# Patient Record
Sex: MALE | Race: WHITE | NOT HISPANIC OR LATINO | URBAN - METROPOLITAN AREA
[De-identification: names, ages, dates, MRNs, and addresses within clinical notes are randomized per-mention and may not be internally consistent; named-entity substitution may affect disease eponyms.]

---

## 2023-04-14 ENCOUNTER — OFFICE VISIT (OUTPATIENT)
Dept: PRIMARY CARE | Facility: CLINIC | Age: 63
End: 2023-04-14
Payer: COMMERCIAL

## 2023-04-14 ENCOUNTER — LAB (OUTPATIENT)
Dept: LAB | Facility: LAB | Age: 63
End: 2023-04-14
Payer: COMMERCIAL

## 2023-04-14 VITALS
HEART RATE: 53 BPM | BODY MASS INDEX: 22.01 KG/M2 | OXYGEN SATURATION: 97 % | WEIGHT: 177 LBS | DIASTOLIC BLOOD PRESSURE: 75 MMHG | SYSTOLIC BLOOD PRESSURE: 115 MMHG | HEIGHT: 75 IN

## 2023-04-14 DIAGNOSIS — Z00.00 ANNUAL PHYSICAL EXAM: Primary | ICD-10-CM

## 2023-04-14 DIAGNOSIS — Z00.00 ANNUAL PHYSICAL EXAM: ICD-10-CM

## 2023-04-14 LAB
CALCIDIOL (25 OH VITAMIN D3) (NG/ML) IN SER/PLAS: 24 NG/ML
ERYTHROCYTE DISTRIBUTION WIDTH (RATIO) BY AUTOMATED COUNT: 11.8 % (ref 11.5–14.5)
ERYTHROCYTE MEAN CORPUSCULAR HEMOGLOBIN CONCENTRATION (G/DL) BY AUTOMATED: 32.6 G/DL (ref 32–36)
ERYTHROCYTE MEAN CORPUSCULAR VOLUME (FL) BY AUTOMATED COUNT: 93 FL (ref 80–100)
ERYTHROCYTES (10*6/UL) IN BLOOD BY AUTOMATED COUNT: 4.86 X10E12/L (ref 4.5–5.9)
ESTIMATED AVERAGE GLUCOSE FOR HBA1C: 103 MG/DL
HEMATOCRIT (%) IN BLOOD BY AUTOMATED COUNT: 45.1 % (ref 41–52)
HEMOGLOBIN (G/DL) IN BLOOD: 14.7 G/DL (ref 13.5–17.5)
HEMOGLOBIN A1C/HEMOGLOBIN TOTAL IN BLOOD: 5.2 %
LEUKOCYTES (10*3/UL) IN BLOOD BY AUTOMATED COUNT: 5.3 X10E9/L (ref 4.4–11.3)
NRBC (PER 100 WBCS) BY AUTOMATED COUNT: 0 /100 WBC (ref 0–0)
PLATELETS (10*3/UL) IN BLOOD AUTOMATED COUNT: 250 X10E9/L (ref 150–450)
PROSTATE SPECIFIC ANTIGEN,SCREEN: 0.49 NG/ML (ref 0–4)
THYROTROPIN (MIU/L) IN SER/PLAS BY DETECTION LIMIT <= 0.05 MIU/L: 0.43 MIU/L (ref 0.44–3.98)
THYROXINE (T4) FREE (NG/DL) IN SER/PLAS: 1.14 NG/DL (ref 0.78–1.48)

## 2023-04-14 PROCEDURE — 82306 VITAMIN D 25 HYDROXY: CPT

## 2023-04-14 PROCEDURE — 80061 LIPID PANEL: CPT

## 2023-04-14 PROCEDURE — 84439 ASSAY OF FREE THYROXINE: CPT

## 2023-04-14 PROCEDURE — 84443 ASSAY THYROID STIM HORMONE: CPT

## 2023-04-14 PROCEDURE — 85027 COMPLETE CBC AUTOMATED: CPT

## 2023-04-14 PROCEDURE — 84153 ASSAY OF PSA TOTAL: CPT

## 2023-04-14 PROCEDURE — 99396 PREV VISIT EST AGE 40-64: CPT | Performed by: INTERNAL MEDICINE

## 2023-04-14 PROCEDURE — 1036F TOBACCO NON-USER: CPT | Performed by: INTERNAL MEDICINE

## 2023-04-14 PROCEDURE — 80053 COMPREHEN METABOLIC PANEL: CPT

## 2023-04-14 PROCEDURE — 83036 HEMOGLOBIN GLYCOSYLATED A1C: CPT

## 2023-04-14 PROCEDURE — 36415 COLL VENOUS BLD VENIPUNCTURE: CPT

## 2023-04-14 NOTE — PROGRESS NOTES
Subjective   Patient ID: Terrance Sosa is a 62 y.o. male who presents for the following    PHYSICAL     Assessment/Plan   CBC, CMP, lipid panel, a1c, tsh, vitamin d, PSA      colonoscopy with dr solares 2023 with 3-5 year follow up     CAD: last stress test 2021 is wnl. Ekg done with cardiology        bilateral cerumen impaction: recommend [facility]     HPI  male with htn, hld, cad comes for the following       INTERMITTENT elevated LFT's: not clinically signifcant and HIV, hepatitis panel, shilpa wnl . will continue to monitor      CAD in the past needing randolph stent to lad. he had at that time he had sob with playing basketball. no chest pain with exertion. following with dr gonsalves      HTN: patient denies any headaches, blurred vision or dizziness. patient denies any stroke like symptoms     HLD: Patient denies any muscle aches and is tolerating statin therapy     no etoh, no illegal dugs. no smokes     mom  cancer large cell lymphoma b     dad  from cancer pancreatic cancer     colonoscopy 2017 follows with dr solares      patient is a  [employer]     There were no vitals taken for this visit.  PHYSICAL EXAM     General appearance: Alert and in no acute distress. speech is clear and coherent  HEENT: Sclera and conjunctiva white, EOMI, uvela midline, no mouth lesions. PERRLA,  nasal turbinates are not swollen without exudate. TM's Mancia with cone of light, external ear canal with scant cerumen. No head trauma  Neck: no carotid bruits or thyromegaly. no lymphadenopathy   Respiratory : No respiratory distress, normal respiratory rhythm and effort. Clear bilateral breath sounds. No wheezing or rhonchi.   Cardiovascular: heart rate regular, S1, S2. no murmurs. no Lower extremity edema  Skin inspection: Normal skin color and pigmentation, normal skin turgor and no visible rash, induration, or cellulitis  MSK: 5/5 strength upper and lower extremities without gait  abnormalities. no loss of muscle mass   Neuro: 2-12 CN grossly intact.  no slurred speech. no lateralizing deficit  Psychiatric Orientation: Oriented to person, place, and time. no depression, homicidal or suicidal thoughts, normal affect  Abdomen: soft, none tender, none distended. no organomegaly    REVIEW OF SYSTEMS   Constitutional: not feeling tired and no fever, chills or sweats. Denies weight loss    HEENT: no earache and no sore throat. no blurred vision and or double vision. no headache  Cardiovascular: no exertional chest pain, no palpitations, no lower extremity edema and no intermittent leg claudication.   Lungs: Denies shortness of breath, exertional dyspnea, wheezing  Gastrointestinal: no change in bowel habits, no diarrhea, no nausea, no vomiting and no abdominal pain. Denies Melena, brbpr or dark stool  Musculoskeletal: no myalgias, no muscle weakness and no limb swelling.   Skin: no rashes, no change in skin color and pigmentation and no skin lumps.   Neurological: no headaches, no seizures, no numbness, no lateralizing deficits and no fainting.   Psychiatric: no depression and no anxiety.   Urine: denies polyuria, hematuria, dysuria   Endocrine: no cold intolerance, no heat intolerance      Not on File    No current outpatient medications on file.     No current facility-administered medications for this visit.       Objective     No visits with results within 4 Month(s) from this visit.   Latest known visit with results is:   Legacy Encounter on 12/11/2020   Component Date Value Ref Range Status    Protime 12/11/2020 11.3  10.1 - 13.3 sec Final    INR 12/11/2020 1.0  0.9 - 1.1 Final    HIV 1 and 2 Screen 12/11/2020 NONREACTIVE  NONREACTIVE Final    Glucose 12/11/2020 67 (L)  74 - 99 mg/dL Final    Sodium 12/11/2020 141  136 - 145 mmol/L Final    Potassium 12/11/2020 4.3  3.5 - 5.3 mmol/L Final    Chloride 12/11/2020 102  98 - 107 mmol/L Final    Bicarbonate 12/11/2020 32  21 - 32 mmol/L Final     Anion Gap 12/11/2020 11  10 - 20 mmol/L Final    Urea Nitrogen 12/11/2020 19  6 - 23 mg/dL Final    Creatinine 12/11/2020 1.04  0.50 - 1.30 mg/dL Final    GLOMERULAR FILTRATION RATE-NON AFR* 12/11/2020 >60  >60 mL/min/1.73m2 Final    GLOMERULAR FILTRATION RATE-* 12/11/2020 >60  >60 mL/min/1.73m2 Final    Calcium 12/11/2020 9.7  8.6 - 10.6 mg/dL Final    Albumin 12/11/2020 4.6  3.4 - 5.0 g/dL Final    Alkaline Phosphatase 12/11/2020 83  33 - 120 U/L Final    Total Protein 12/11/2020 6.7  6.4 - 8.2 g/dL Final    AST 12/11/2020 35  9 - 39 U/L Final    Total Bilirubin 12/11/2020 0.7  0.0 - 1.2 mg/dL Final    ALT (SGPT) 12/11/2020 51  10 - 52 U/L Final    Hep A IgM 12/11/2020 NONREACTIVE  NONREACTIVE Final    Hep B Core IgM 12/11/2020 NONREACTIVE  NONREACTIVE Final    Hepatitis B Surface Ag 12/11/2020 NONREACTIVE  NONREACTIVE Final    Hepatitis C Ab 12/11/2020 NONREACTIVE  NONREACTIVE Final    ANTI-NUCLEAR ANTIBODY (RAKAN) 12/11/2020 NEGATIVE  NEGATIVE Final       Radiology: Reviewed imaging in powerchart.  No results found.    No family history on file.  Social History     Socioeconomic History    Marital status: Unknown     Spouse name: Not on file    Number of children: Not on file    Years of education: Not on file    Highest education level: Not on file   Occupational History    Not on file   Tobacco Use    Smoking status: Not on file    Smokeless tobacco: Not on file   Vaping Use    Vaping status: Not on file   Substance and Sexual Activity    Alcohol use: Not on file    Drug use: Not on file    Sexual activity: Not on file   Other Topics Concern    Not on file   Social History Narrative    Not on file     Social Determinants of Health     Financial Resource Strain: Not on file   Food Insecurity: Not on file   Transportation Needs: Not on file   Physical Activity: Not on file   Stress: Not on file   Social Connections: Not on file   Intimate Partner Violence: Not on file   Housing Stability: Not on file      Past Medical History:   Diagnosis Date    Presence of coronary angioplasty implant and graft     S/P coronary artery stent placement     Past Surgical History:   Procedure Laterality Date    OTHER SURGICAL HISTORY  11/30/2020    Colonoscopy       Charting was completed using voice recognition technology and may include unintended errors.

## 2023-04-15 LAB
ALANINE AMINOTRANSFERASE (SGPT) (U/L) IN SER/PLAS: 66 U/L (ref 10–52)
ALBUMIN (G/DL) IN SER/PLAS: 4.4 G/DL (ref 3.4–5)
ALKALINE PHOSPHATASE (U/L) IN SER/PLAS: 63 U/L (ref 33–136)
ANION GAP IN SER/PLAS: 12 MMOL/L (ref 10–20)
ASPARTATE AMINOTRANSFERASE (SGOT) (U/L) IN SER/PLAS: 43 U/L (ref 9–39)
BILIRUBIN TOTAL (MG/DL) IN SER/PLAS: 0.8 MG/DL (ref 0–1.2)
CALCIUM (MG/DL) IN SER/PLAS: 9.8 MG/DL (ref 8.6–10.6)
CARBON DIOXIDE, TOTAL (MMOL/L) IN SER/PLAS: 32 MMOL/L (ref 21–32)
CHLORIDE (MMOL/L) IN SER/PLAS: 103 MMOL/L (ref 98–107)
CHOLESTEROL (MG/DL) IN SER/PLAS: 133 MG/DL (ref 0–199)
CHOLESTEROL IN HDL (MG/DL) IN SER/PLAS: 46.2 MG/DL
CHOLESTEROL/HDL RATIO: 2.9
CREATININE (MG/DL) IN SER/PLAS: 1.08 MG/DL (ref 0.5–1.3)
GFR MALE: 78 ML/MIN/1.73M2
GLUCOSE (MG/DL) IN SER/PLAS: 82 MG/DL (ref 74–99)
LDL: 75 MG/DL (ref 0–99)
POTASSIUM (MMOL/L) IN SER/PLAS: 4.2 MMOL/L (ref 3.5–5.3)
PROTEIN TOTAL: 6.7 G/DL (ref 6.4–8.2)
SODIUM (MMOL/L) IN SER/PLAS: 143 MMOL/L (ref 136–145)
TRIGLYCERIDE (MG/DL) IN SER/PLAS: 58 MG/DL (ref 0–149)
UREA NITROGEN (MG/DL) IN SER/PLAS: 24 MG/DL (ref 6–23)
VLDL: 12 MG/DL (ref 0–40)

## 2023-04-17 ENCOUNTER — CLINICAL SUPPORT (OUTPATIENT)
Dept: PRIMARY CARE | Facility: CLINIC | Age: 63
End: 2023-04-17
Payer: COMMERCIAL

## 2023-04-17 DIAGNOSIS — H61.23 IMPACTED CERUMEN, BILATERAL: ICD-10-CM

## 2023-04-17 PROCEDURE — 69209 REMOVE IMPACTED EAR WAX UNI: CPT | Performed by: INTERNAL MEDICINE

## 2023-04-17 NOTE — PROGRESS NOTES
Patient here for bilateral ear lavage. Patient tolerated procedure well. Patient satisfied with results.

## 2023-04-19 ENCOUNTER — TELEPHONE (OUTPATIENT)
Dept: PRIMARY CARE | Facility: CLINIC | Age: 63
End: 2023-04-19
Payer: COMMERCIAL

## 2023-04-19 NOTE — TELEPHONE ENCOUNTER
----- Message from Sylvain Lam DO sent at 4/18/2023  9:38 AM EDT -----  Recommend a virtual visit in 2-4 weeks to discuss liver function abnormality

## 2023-04-20 ENCOUNTER — TELEPHONE (OUTPATIENT)
Dept: PRIMARY CARE | Facility: CLINIC | Age: 63
End: 2023-04-20
Payer: COMMERCIAL

## 2023-05-05 ENCOUNTER — APPOINTMENT (OUTPATIENT)
Dept: PRIMARY CARE | Facility: CLINIC | Age: 63
End: 2023-05-05
Payer: COMMERCIAL

## 2023-05-15 ENCOUNTER — TELEMEDICINE (OUTPATIENT)
Dept: PRIMARY CARE | Facility: CLINIC | Age: 63
End: 2023-05-15
Payer: COMMERCIAL

## 2023-05-15 DIAGNOSIS — R79.89 ELEVATED LIVER FUNCTION TESTS: Primary | ICD-10-CM

## 2023-05-15 PROCEDURE — 99212 OFFICE O/P EST SF 10 MIN: CPT | Performed by: INTERNAL MEDICINE

## 2023-05-15 NOTE — PROGRESS NOTES
Subjective   Patient ID: Terrance Sosa is a 62 y.o. male who presents for the following    Virtual visit     PHYSICAL 2023    Assessment/Plan   ELEVATE LIVER FUNCTION:  acute hepatitis panel and HIV niegative. He had a liver ultrasound in  which was wnl. Patient denies drinking alcohol. Recommend referral to GI     Vit D low, recommend 1000 units daily      colonoscopy with dr solares 2023 with 3-5 year follow up     CAD: last stress test 2021 is wnl. Ekg done with cardiology        bilateral cerumen impaction: recommend [facility]     HPI  male with htn, hld, cad comes for the following       INTERMITTENT elevated LFT's: not clinically signifcant and HIV, hepatitis panel, shilpa wnl . will continue to monitor      CAD in the past needing randolph stent to lad. he had at that time he had sob with playing basketball. no chest pain with exertion. following with dr gonsalves      HTN: patient denies any headaches, blurred vision or dizziness. patient denies any stroke like symptoms     HLD: Patient denies any muscle aches and is tolerating statin therapy     no etoh, no illegal dugs. no smokes     mom  cancer large cell lymphoma b     dad  from cancer pancreatic cancer     colonoscopy 2017 follows with dr solares      patient is a  [employer]     Visit Vitals  Smoking Status Never     PHYSICAL EXAM     General appearance: Alert and in no acute distress. speech is clear and coherent  HEENT: Sclera and conjunctiva white, EOMI, uvela midline, no mouth lesions. PERRLA,  nasal turbinates are not swollen without exudate. TM's Mancia with cone of light, external ear canal with scant cerumen. No head trauma  Neck: no carotid bruits or thyromegaly. no lymphadenopathy   Respiratory : No respiratory distress, normal respiratory rhythm and effort. Clear bilateral breath sounds. No wheezing or rhonchi.   Cardiovascular: heart rate regular, S1, S2. no murmurs. no Lower extremity  edema  Skin inspection: Normal skin color and pigmentation, normal skin turgor and no visible rash, induration, or cellulitis  MSK: 5/5 strength upper and lower extremities without gait abnormalities. no loss of muscle mass   Neuro: 2-12 CN grossly intact.  no slurred speech. no lateralizing deficit  Psychiatric Orientation: Oriented to person, place, and time. no depression, homicidal or suicidal thoughts, normal affect  Abdomen: soft, none tender, none distended. no organomegaly    REVIEW OF SYSTEMS   Constitutional: not feeling tired and no fever, chills or sweats. Denies weight loss    HEENT: no earache and no sore throat. no blurred vision and or double vision. no headache  Cardiovascular: no exertional chest pain, no palpitations, no lower extremity edema and no intermittent leg claudication.   Lungs: Denies shortness of breath, exertional dyspnea, wheezing  Gastrointestinal: no change in bowel habits, no diarrhea, no nausea, no vomiting and no abdominal pain. Denies Melena, brbpr or dark stool  Musculoskeletal: no myalgias, no muscle weakness and no limb swelling.   Skin: no rashes, no change in skin color and pigmentation and no skin lumps.   Neurological: no headaches, no seizures, no numbness, no lateralizing deficits and no fainting.   Psychiatric: no depression and no anxiety.   Urine: denies polyuria, hematuria, dysuria   Endocrine: no cold intolerance, no heat intolerance      No Known Allergies    No current outpatient medications on file.     No current facility-administered medications for this visit.       Objective     Lab on 04/14/2023   Component Date Value Ref Range Status    WBC 04/14/2023 5.3  4.4 - 11.3 x10E9/L Final    nRBC 04/14/2023 0.0  0.0 - 0.0 /100 WBC Final    RBC 04/14/2023 4.86  4.50 - 5.90 x10E12/L Final    Hemoglobin 04/14/2023 14.7  13.5 - 17.5 g/dL Final    Hematocrit 04/14/2023 45.1  41.0 - 52.0 % Final    MCV 04/14/2023 93  80 - 100 fL Final    MCHC 04/14/2023 32.6  32.0 -  36.0 g/dL Final    Platelets 04/14/2023 250  150 - 450 x10E9/L Final    RDW 04/14/2023 11.8  11.5 - 14.5 % Final    Glucose 04/14/2023 82  74 - 99 mg/dL Final    Sodium 04/14/2023 143  136 - 145 mmol/L Final    Potassium 04/14/2023 4.2  3.5 - 5.3 mmol/L Final    Chloride 04/14/2023 103  98 - 107 mmol/L Final    Bicarbonate 04/14/2023 32  21 - 32 mmol/L Final    Anion Gap 04/14/2023 12  10 - 20 mmol/L Final    Urea Nitrogen 04/14/2023 24 (H)  6 - 23 mg/dL Final    Creatinine 04/14/2023 1.08  0.50 - 1.30 mg/dL Final    GFR MALE 04/14/2023 78  >90 mL/min/1.73m2 Final    Calcium 04/14/2023 9.8  8.6 - 10.6 mg/dL Final    Albumin 04/14/2023 4.4  3.4 - 5.0 g/dL Final    Alkaline Phosphatase 04/14/2023 63  33 - 136 U/L Final    Total Protein 04/14/2023 6.7  6.4 - 8.2 g/dL Final    AST 04/14/2023 43 (H)  9 - 39 U/L Final    Total Bilirubin 04/14/2023 0.8  0.0 - 1.2 mg/dL Final    ALT (SGPT) 04/14/2023 66 (H)  10 - 52 U/L Final    Hemoglobin A1C 04/14/2023 5.2  % Final    Estimated Average Glucose 04/14/2023 103  MG/DL Final    Cholesterol 04/14/2023 133  0 - 199 mg/dL Final    HDL 04/14/2023 46.2  mg/dL Final    Cholesterol/HDL Ratio 04/14/2023 2.9   Final    LDL 04/14/2023 75  0 - 99 mg/dL Final    VLDL 04/14/2023 12  0 - 40 mg/dL Final    Triglycerides 04/14/2023 58  0 - 149 mg/dL Final    TSH 04/14/2023 0.43 (L)  0.44 - 3.98 mIU/L Final    Vitamin D, 25-Hydroxy 04/14/2023 24 (A)  ng/mL Final    Prostate Specific Antigen,Screen 04/14/2023 0.49  0.00 - 4.00 ng/mL Final    Free T4 04/14/2023 1.14  0.78 - 1.48 ng/dL Final       Radiology: Reviewed imaging in powerchart.  No results found.    No family history on file.  Social History     Socioeconomic History    Marital status: Single     Spouse name: Not on file    Number of children: Not on file    Years of education: Not on file    Highest education level: Not on file   Occupational History    Not on file   Tobacco Use    Smoking status: Never    Smokeless tobacco: Never    Vaping Use    Vaping status: Not on file   Substance and Sexual Activity    Alcohol use: Not on file    Drug use: Not on file    Sexual activity: Not on file   Other Topics Concern    Not on file   Social History Narrative    Not on file     Social Determinants of Health     Financial Resource Strain: Not on file   Food Insecurity: Not on file   Transportation Needs: Not on file   Physical Activity: Not on file   Stress: Not on file   Social Connections: Not on file   Intimate Partner Violence: Not on file   Housing Stability: Not on file     Past Medical History:   Diagnosis Date    Presence of coronary angioplasty implant and graft     S/P coronary artery stent placement     Past Surgical History:   Procedure Laterality Date    OTHER SURGICAL HISTORY  11/30/2020    Colonoscopy       Charting was completed using voice recognition technology and may include unintended errors.

## 2024-02-20 ENCOUNTER — OFFICE VISIT (OUTPATIENT)
Dept: PRIMARY CARE | Facility: CLINIC | Age: 64
End: 2024-02-20
Payer: COMMERCIAL

## 2024-02-20 ENCOUNTER — LAB (OUTPATIENT)
Dept: LAB | Facility: LAB | Age: 64
End: 2024-02-20
Payer: COMMERCIAL

## 2024-02-20 VITALS
OXYGEN SATURATION: 95 % | BODY MASS INDEX: 23.05 KG/M2 | WEIGHT: 185.4 LBS | HEIGHT: 75 IN | HEART RATE: 61 BPM | SYSTOLIC BLOOD PRESSURE: 120 MMHG | DIASTOLIC BLOOD PRESSURE: 80 MMHG

## 2024-02-20 DIAGNOSIS — I25.10 CORONARY ARTERY DISEASE INVOLVING NATIVE CORONARY ARTERY OF NATIVE HEART WITHOUT ANGINA PECTORIS: ICD-10-CM

## 2024-02-20 DIAGNOSIS — Z00.00 ANNUAL PHYSICAL EXAM: ICD-10-CM

## 2024-02-20 DIAGNOSIS — Z00.00 ANNUAL PHYSICAL EXAM: Primary | ICD-10-CM

## 2024-02-20 DIAGNOSIS — R79.89 ELEVATED LIVER FUNCTION TESTS: ICD-10-CM

## 2024-02-20 DIAGNOSIS — E55.9 VITAMIN D DEFICIENCY: ICD-10-CM

## 2024-02-20 LAB
25(OH)D3 SERPL-MCNC: 34 NG/ML (ref 30–100)
ALBUMIN SERPL BCP-MCNC: 4.2 G/DL (ref 3.4–5)
ALP SERPL-CCNC: 65 U/L (ref 33–136)
ALT SERPL W P-5'-P-CCNC: 49 U/L (ref 10–52)
ANION GAP SERPL CALC-SCNC: 12 MMOL/L (ref 10–20)
AST SERPL W P-5'-P-CCNC: 37 U/L (ref 9–39)
BILIRUB SERPL-MCNC: 0.8 MG/DL (ref 0–1.2)
BUN SERPL-MCNC: 21 MG/DL (ref 6–23)
CALCIUM SERPL-MCNC: 9.3 MG/DL (ref 8.6–10.6)
CHLORIDE SERPL-SCNC: 104 MMOL/L (ref 98–107)
CHOLEST SERPL-MCNC: 145 MG/DL (ref 0–199)
CHOLESTEROL/HDL RATIO: 2.7
CO2 SERPL-SCNC: 29 MMOL/L (ref 21–32)
CREAT SERPL-MCNC: 0.98 MG/DL (ref 0.5–1.3)
EGFRCR SERPLBLD CKD-EPI 2021: 88 ML/MIN/1.73M*2
ERYTHROCYTE [DISTWIDTH] IN BLOOD BY AUTOMATED COUNT: 11.8 % (ref 11.5–14.5)
EST. AVERAGE GLUCOSE BLD GHB EST-MCNC: 100 MG/DL
GLUCOSE SERPL-MCNC: 102 MG/DL (ref 74–99)
HBA1C MFR BLD: 5.1 %
HCT VFR BLD AUTO: 44.8 % (ref 41–52)
HDLC SERPL-MCNC: 53.2 MG/DL
HGB BLD-MCNC: 15.6 G/DL (ref 13.5–17.5)
LDLC SERPL CALC-MCNC: 79 MG/DL
MCH RBC QN AUTO: 31.5 PG (ref 26–34)
MCHC RBC AUTO-ENTMCNC: 34.8 G/DL (ref 32–36)
MCV RBC AUTO: 90 FL (ref 80–100)
NON HDL CHOLESTEROL: 92 MG/DL (ref 0–149)
NRBC BLD-RTO: 0 /100 WBCS (ref 0–0)
PLATELET # BLD AUTO: 173 X10*3/UL (ref 150–450)
POTASSIUM SERPL-SCNC: 4.2 MMOL/L (ref 3.5–5.3)
PROT SERPL-MCNC: 6.4 G/DL (ref 6.4–8.2)
PSA SERPL-MCNC: 0.54 NG/ML
RBC # BLD AUTO: 4.96 X10*6/UL (ref 4.5–5.9)
SODIUM SERPL-SCNC: 141 MMOL/L (ref 136–145)
TRIGL SERPL-MCNC: 62 MG/DL (ref 0–149)
TSH SERPL-ACNC: 0.59 MIU/L (ref 0.44–3.98)
VLDL: 12 MG/DL (ref 0–40)
WBC # BLD AUTO: 4.3 X10*3/UL (ref 4.4–11.3)

## 2024-02-20 PROCEDURE — 83036 HEMOGLOBIN GLYCOSYLATED A1C: CPT

## 2024-02-20 PROCEDURE — 93000 ELECTROCARDIOGRAM COMPLETE: CPT | Performed by: INTERNAL MEDICINE

## 2024-02-20 PROCEDURE — 1036F TOBACCO NON-USER: CPT | Performed by: INTERNAL MEDICINE

## 2024-02-20 PROCEDURE — 99396 PREV VISIT EST AGE 40-64: CPT | Performed by: INTERNAL MEDICINE

## 2024-02-20 PROCEDURE — 84443 ASSAY THYROID STIM HORMONE: CPT

## 2024-02-20 PROCEDURE — 80053 COMPREHEN METABOLIC PANEL: CPT

## 2024-02-20 PROCEDURE — 80061 LIPID PANEL: CPT

## 2024-02-20 PROCEDURE — 36415 COLL VENOUS BLD VENIPUNCTURE: CPT

## 2024-02-20 PROCEDURE — 82306 VITAMIN D 25 HYDROXY: CPT

## 2024-02-20 PROCEDURE — 84153 ASSAY OF PSA TOTAL: CPT

## 2024-02-20 PROCEDURE — 85027 COMPLETE CBC AUTOMATED: CPT

## 2024-02-20 RX ORDER — DOXYCYCLINE HYCLATE 20 MG
20 TABLET ORAL 2 TIMES DAILY
COMMUNITY
Start: 2016-02-23

## 2024-02-20 RX ORDER — SIMVASTATIN 20 MG/1
20 TABLET, FILM COATED ORAL NIGHTLY
COMMUNITY
Start: 2013-04-16

## 2024-02-20 NOTE — PROGRESS NOTES
"Subjective   Patient ID: Terrance Sosa is a 63 y.o. male who presents for the following     PHYSICAL 2023 --> 2024    Assessment/Plan   ELEVATE LIVER FUNCTION:  acute hepatitis panel and HIV niegative. He had a liver ultrasound in  which was wnl. Patient denies drinking alcohol. Recommend referral to GI     Vit D low, recommend 1000 units daily      colonoscopy with dr solares 2023 with 3-5 year follow up     CAD: last stress test 2021 is wnl. Ekg done with cardiology        bilateral cerumen impaction: recommend [facility]     HPI  male with htn, hld, cad comes for the following       INTERMITTENT elevated LFT's: not clinically signifcant and HIV, hepatitis panel, shilpa wnl . will continue to monitor      CAD in the past needing randolph stent to lad. he had at that time he had sob with playing basketball. no chest pain with exertion. following with dr gonsalves      HTN: patient denies any headaches, blurred vision or dizziness. patient denies any stroke like symptoms     HLD: Patient denies any muscle aches and is tolerating statin therapy     no etoh, no illegal dugs. no smokes     mom  cancer large cell lymphoma b     dad  from cancer pancreatic cancer     patient is a  [employer]     Visit Vitals  /80 (BP Location: Left arm, Patient Position: Sitting, BP Cuff Size: Adult)   Pulse 61   Ht 1.905 m (6' 3\")   Wt 84.1 kg (185 lb 6.4 oz)   SpO2 95%   BMI 23.17 kg/m²   Smoking Status Never   BSA 2.11 m²     PHYSICAL EXAM     General appearance: Alert and in no acute distress. speech is clear and coherent  HEENT: Sclera and conjunctiva white, EOMI, uvela midline, no mouth lesions. PERRLA,  nasal turbinates are not swollen without exudate. TM's Mancia with cone of light, external ear canal with scant cerumen. No head trauma  Neck: no carotid bruits or thyromegaly. no lymphadenopathy   Respiratory : No respiratory distress, normal respiratory rhythm and effort. Clear " bilateral breath sounds. No wheezing or rhonchi.   Cardiovascular: heart rate regular, S1, S2. no murmurs. no Lower extremity edema  Skin inspection: Normal skin color and pigmentation, normal skin turgor and no visible rash, induration, or cellulitis  MSK: 5/5 strength upper and lower extremities without gait abnormalities. no loss of muscle mass   Neuro: 2-12 CN grossly intact.  no slurred speech. no lateralizing deficit  Psychiatric Orientation: Oriented to person, place, and time. no depression, homicidal or suicidal thoughts, normal affect  Abdomen: soft, none tender, none distended. no organomegaly    REVIEW OF SYSTEMS   Constitutional: not feeling tired and no fever, chills or sweats. Denies weight loss    HEENT: no earache and no sore throat. no blurred vision and or double vision. no headache  Cardiovascular: no exertional chest pain, no palpitations, no lower extremity edema and no intermittent leg claudication.   Lungs: Denies shortness of breath, exertional dyspnea, wheezing  Gastrointestinal: no change in bowel habits, no diarrhea, no nausea, no vomiting and no abdominal pain. Denies Melena, brbpr or dark stool  Musculoskeletal: no myalgias, no muscle weakness and no limb swelling.   Skin: no rashes, no change in skin color and pigmentation and no skin lumps.   Neurological: no headaches, no seizures, no numbness, no lateralizing deficits and no fainting.   Psychiatric: no depression and no anxiety.   Urine: denies polyuria, hematuria, dysuria   Endocrine: no cold intolerance, no heat intolerance      No Known Allergies    Current Outpatient Medications   Medication Sig Dispense Refill    doxycycline (Periostat) 20 mg tablet Take 1 tablet (20 mg) by mouth 2 times a day.      simvastatin (Zocor) 20 mg tablet Take 1 tablet (20 mg) by mouth once daily at bedtime.       No current facility-administered medications for this visit.       Objective     No visits with results within 4 Month(s) from this visit.    Latest known visit with results is:   Lab on 04/14/2023   Component Date Value Ref Range Status    WBC 04/14/2023 5.3  4.4 - 11.3 x10E9/L Final    nRBC 04/14/2023 0.0  0.0 - 0.0 /100 WBC Final    RBC 04/14/2023 4.86  4.50 - 5.90 x10E12/L Final    Hemoglobin 04/14/2023 14.7  13.5 - 17.5 g/dL Final    Hematocrit 04/14/2023 45.1  41.0 - 52.0 % Final    MCV 04/14/2023 93  80 - 100 fL Final    MCHC 04/14/2023 32.6  32.0 - 36.0 g/dL Final    Platelets 04/14/2023 250  150 - 450 x10E9/L Final    RDW 04/14/2023 11.8  11.5 - 14.5 % Final    Glucose 04/14/2023 82  74 - 99 mg/dL Final    Sodium 04/14/2023 143  136 - 145 mmol/L Final    Potassium 04/14/2023 4.2  3.5 - 5.3 mmol/L Final    Chloride 04/14/2023 103  98 - 107 mmol/L Final    Bicarbonate 04/14/2023 32  21 - 32 mmol/L Final    Anion Gap 04/14/2023 12  10 - 20 mmol/L Final    Urea Nitrogen 04/14/2023 24 (H)  6 - 23 mg/dL Final    Creatinine 04/14/2023 1.08  0.50 - 1.30 mg/dL Final    GFR MALE 04/14/2023 78  >90 mL/min/1.73m2 Final    Calcium 04/14/2023 9.8  8.6 - 10.6 mg/dL Final    Albumin 04/14/2023 4.4  3.4 - 5.0 g/dL Final    Alkaline Phosphatase 04/14/2023 63  33 - 136 U/L Final    Total Protein 04/14/2023 6.7  6.4 - 8.2 g/dL Final    AST 04/14/2023 43 (H)  9 - 39 U/L Final    Total Bilirubin 04/14/2023 0.8  0.0 - 1.2 mg/dL Final    ALT (SGPT) 04/14/2023 66 (H)  10 - 52 U/L Final    Hemoglobin A1C 04/14/2023 5.2  % Final    Estimated Average Glucose 04/14/2023 103  MG/DL Final    Cholesterol 04/14/2023 133  0 - 199 mg/dL Final    HDL 04/14/2023 46.2  mg/dL Final    Cholesterol/HDL Ratio 04/14/2023 2.9   Final    LDL 04/14/2023 75  0 - 99 mg/dL Final    VLDL 04/14/2023 12  0 - 40 mg/dL Final    Triglycerides 04/14/2023 58  0 - 149 mg/dL Final    TSH 04/14/2023 0.43 (L)  0.44 - 3.98 mIU/L Final    Vitamin D, 25-Hydroxy 04/14/2023 24 (A)  ng/mL Final    Prostate Specific Antigen,Screen 04/14/2023 0.49  0.00 - 4.00 ng/mL Final    Free T4 04/14/2023 1.14  0.78 - 1.48  ng/dL Final       Radiology: Reviewed imaging in powerchart.  No results found.    No family history on file.  Social History     Socioeconomic History    Marital status: Single     Spouse name: None    Number of children: None    Years of education: None    Highest education level: None   Occupational History    None   Tobacco Use    Smoking status: Never    Smokeless tobacco: Never   Substance and Sexual Activity    Alcohol use: None    Drug use: None    Sexual activity: None   Other Topics Concern    None   Social History Narrative    None     Social Determinants of Health     Financial Resource Strain: Not on file   Food Insecurity: Not on file   Transportation Needs: Not on file   Physical Activity: Not on file   Stress: Not on file   Social Connections: Not on file   Intimate Partner Violence: Not on file   Housing Stability: Not on file     Past Medical History:   Diagnosis Date    Presence of coronary angioplasty implant and graft     S/P coronary artery stent placement     Past Surgical History:   Procedure Laterality Date    OTHER SURGICAL HISTORY  11/30/2020    Colonoscopy       Charting was completed using voice recognition technology and may include unintended errors.

## 2024-08-06 ENCOUNTER — OFFICE VISIT (OUTPATIENT)
Dept: PRIMARY CARE | Facility: CLINIC | Age: 64
End: 2024-08-06
Payer: COMMERCIAL

## 2024-08-06 VITALS
WEIGHT: 186 LBS | HEART RATE: 70 BPM | BODY MASS INDEX: 23.13 KG/M2 | DIASTOLIC BLOOD PRESSURE: 80 MMHG | OXYGEN SATURATION: 96 % | SYSTOLIC BLOOD PRESSURE: 130 MMHG | HEIGHT: 75 IN

## 2024-08-06 DIAGNOSIS — L02.413 ABSCESS OF ARM, RIGHT: Primary | ICD-10-CM

## 2024-08-06 PROCEDURE — 3008F BODY MASS INDEX DOCD: CPT | Performed by: STUDENT IN AN ORGANIZED HEALTH CARE EDUCATION/TRAINING PROGRAM

## 2024-08-06 PROCEDURE — 99213 OFFICE O/P EST LOW 20 MIN: CPT | Performed by: STUDENT IN AN ORGANIZED HEALTH CARE EDUCATION/TRAINING PROGRAM

## 2024-08-06 PROCEDURE — 1036F TOBACCO NON-USER: CPT | Performed by: STUDENT IN AN ORGANIZED HEALTH CARE EDUCATION/TRAINING PROGRAM

## 2024-08-06 RX ORDER — ASPIRIN 81 MG/1
81 TABLET ORAL DAILY
COMMUNITY

## 2024-08-06 RX ORDER — SULFAMETHOXAZOLE AND TRIMETHOPRIM 800; 160 MG/1; MG/1
1 TABLET ORAL 2 TIMES DAILY
Qty: 14 TABLET | Refills: 0 | Status: SHIPPED | OUTPATIENT
Start: 2024-08-06 | End: 2024-08-13

## 2024-08-06 NOTE — PROGRESS NOTES
"Subjective   Patient ID: Terrance Sosa is a 64 y.o. male who presents for the following    Assessment/Plan   RUE Abscess  -Not ready to drain yet  -Already on doxycycline daily for many years for rosascea  -Add bactrim DS BID x 7 days   -Advised to stop doxycycline if diarrhea develops  -return precautions given   -Follow up in 1 week for incision/drainage    HPI  64M presents for acute sick visit. Has a \"lump\" on his R upper arm. Has had it for 1 year, but recently starting hurting a few days ago. Denies any fevers, chills, lightheadedness, dizziness, or drainage from the area.     Denies fevers, chills, weight loss, lightheadedness, dizziness, vision changes, sore throat, runny nose, CP, SOB, cough, palpitations, n/v/d, abd pain, black/bloody stools, arthralgias, mood disturbance, or new numbness/weakness/tingling in arms/legs/face.      Denies any major use of tobacco, alcohol or drugs     Visit Vitals  /80   Pulse 70   Ht 1.905 m (6' 3\")   Wt 84.4 kg (186 lb)   SpO2 96%   BMI 23.25 kg/m²   Smoking Status Never   BSA 2.11 m²     PHYSICAL EXAM    Physical Exam     Visit Vitals  /80   Pulse 70   Ht 1.905 m (6' 3\")   Wt 84.4 kg (186 lb)   SpO2 96%   BMI 23.25 kg/m²   Smoking Status Never   BSA 2.11 m²        General: NAD. NCAT. Aox3   HEENT: PERRLA. EOMI. MMM. Nares patent bl.  Cardiovascular: RRR. No MRG. S1/S2 wnl.   Respiratory: CTABL. No acute respiratory distress.   MSK: ROM x 4.   Extremities: No edema. Cap refill < 2 sec.   Skin: Quarter sized abscess near the R axilla. No LAD. No purulent drainage.  Neuro: Aox3. Cranial Nerves grossly intact. Motor/sensory wnl.   Psych: Mood wnl.           REVIEW OF SYSTEMS     ROS In HPI   No Known Allergies    Current Outpatient Medications   Medication Sig Dispense Refill    aspirin 81 mg EC tablet Take 1 tablet (81 mg) by mouth once daily.      doxycycline (Periostat) 20 mg tablet Take 1 tablet (20 mg) by mouth 2 times a day.      simvastatin (Zocor) 20 mg " tablet Take 1 tablet (20 mg) by mouth once daily at bedtime.       No current facility-administered medications for this visit.       Objective     No visits with results within 4 Month(s) from this visit.   Latest known visit with results is:   Lab on 02/20/2024   Component Date Value Ref Range Status    WBC 02/20/2024 4.3 (L)  4.4 - 11.3 x10*3/uL Final    nRBC 02/20/2024 0.0  0.0 - 0.0 /100 WBCs Final    RBC 02/20/2024 4.96  4.50 - 5.90 x10*6/uL Final    Hemoglobin 02/20/2024 15.6  13.5 - 17.5 g/dL Final    Hematocrit 02/20/2024 44.8  41.0 - 52.0 % Final    MCV 02/20/2024 90  80 - 100 fL Final    MCH 02/20/2024 31.5  26.0 - 34.0 pg Final    MCHC 02/20/2024 34.8  32.0 - 36.0 g/dL Final    RDW 02/20/2024 11.8  11.5 - 14.5 % Final    Platelets 02/20/2024 173  150 - 450 x10*3/uL Final    Glucose 02/20/2024 102 (H)  74 - 99 mg/dL Final    Sodium 02/20/2024 141  136 - 145 mmol/L Final    Potassium 02/20/2024 4.2  3.5 - 5.3 mmol/L Final    Chloride 02/20/2024 104  98 - 107 mmol/L Final    Bicarbonate 02/20/2024 29  21 - 32 mmol/L Final    Anion Gap 02/20/2024 12  10 - 20 mmol/L Final    Urea Nitrogen 02/20/2024 21  6 - 23 mg/dL Final    Creatinine 02/20/2024 0.98  0.50 - 1.30 mg/dL Final    eGFR 02/20/2024 88  >60 mL/min/1.73m*2 Final    Calcium 02/20/2024 9.3  8.6 - 10.6 mg/dL Final    Albumin 02/20/2024 4.2  3.4 - 5.0 g/dL Final    Alkaline Phosphatase 02/20/2024 65  33 - 136 U/L Final    Total Protein 02/20/2024 6.4  6.4 - 8.2 g/dL Final    AST 02/20/2024 37  9 - 39 U/L Final    Bilirubin, Total 02/20/2024 0.8  0.0 - 1.2 mg/dL Final    ALT 02/20/2024 49  10 - 52 U/L Final    Hemoglobin A1C 02/20/2024 5.1  see below % Final    Estimated Average Glucose 02/20/2024 100  Not Established mg/dL Final    Cholesterol 02/20/2024 145  0 - 199 mg/dL Final    HDL-Cholesterol 02/20/2024 53.2  mg/dL Final    Cholesterol/HDL Ratio 02/20/2024 2.7   Final    LDL Calculated 02/20/2024 79  <=99 mg/dL Final    VLDL 02/20/2024 12  0 - 40  mg/dL Final    Triglycerides 02/20/2024 62  0 - 149 mg/dL Final    Non HDL Cholesterol 02/20/2024 92  0 - 149 mg/dL Final    Thyroid Stimulating Hormone 02/20/2024 0.59  0.44 - 3.98 mIU/L Final    Vitamin D, 25-Hydroxy, Total 02/20/2024 34  30 - 100 ng/mL Final    Prostate Specific Antigen,Screen 02/20/2024 0.54  <=4.00 ng/mL Final       Radiology: Reviewed imaging in powerchart.  No results found.    No family history on file.  Social History     Socioeconomic History    Marital status: Single   Tobacco Use    Smoking status: Never    Smokeless tobacco: Never   Substance and Sexual Activity    Alcohol use: Not Currently     Past Medical History:   Diagnosis Date    Presence of coronary angioplasty implant and graft     S/P coronary artery stent placement     Past Surgical History:   Procedure Laterality Date    OTHER SURGICAL HISTORY  11/30/2020    Colonoscopy       Charting was completed using voice recognition technology and may include unintended errors.

## 2024-08-13 ENCOUNTER — APPOINTMENT (OUTPATIENT)
Dept: PRIMARY CARE | Facility: CLINIC | Age: 64
End: 2024-08-13
Payer: COMMERCIAL

## 2024-08-13 ENCOUNTER — OFFICE VISIT (OUTPATIENT)
Dept: SURGERY | Facility: CLINIC | Age: 64
End: 2024-08-13
Payer: COMMERCIAL

## 2024-08-13 VITALS
HEART RATE: 65 BPM | SYSTOLIC BLOOD PRESSURE: 133 MMHG | BODY MASS INDEX: 23.23 KG/M2 | HEIGHT: 75 IN | WEIGHT: 186.8 LBS | DIASTOLIC BLOOD PRESSURE: 74 MMHG | OXYGEN SATURATION: 96 %

## 2024-08-13 DIAGNOSIS — L02.413 ABSCESS OF ARM, RIGHT: Primary | ICD-10-CM

## 2024-08-13 DIAGNOSIS — L02.413 CUTANEOUS ABSCESS OF RIGHT UPPER EXTREMITY: Primary | ICD-10-CM

## 2024-08-13 PROCEDURE — 1036F TOBACCO NON-USER: CPT | Performed by: STUDENT IN AN ORGANIZED HEALTH CARE EDUCATION/TRAINING PROGRAM

## 2024-08-13 PROCEDURE — 99213 OFFICE O/P EST LOW 20 MIN: CPT | Performed by: STUDENT IN AN ORGANIZED HEALTH CARE EDUCATION/TRAINING PROGRAM

## 2024-08-13 PROCEDURE — 1036F TOBACCO NON-USER: CPT | Performed by: PHYSICIAN ASSISTANT

## 2024-08-13 PROCEDURE — 99203 OFFICE O/P NEW LOW 30 MIN: CPT | Performed by: PHYSICIAN ASSISTANT

## 2024-08-13 PROCEDURE — 3008F BODY MASS INDEX DOCD: CPT | Performed by: STUDENT IN AN ORGANIZED HEALTH CARE EDUCATION/TRAINING PROGRAM

## 2024-08-13 NOTE — PROGRESS NOTES
"Subjective   Patient ID: Terrance Sosa is a 64 y.o. male who presents for the following    Assessment/Plan   RUE Abscess  -Continue Bactrim  -Pt to have I&D with Dr Rader today.   -Follow up with PCP       HPI  64M presents for acute sick visit/follow up  Had Abscess on R arm that I evaluated last week. We started abx and advised him to follow up today.   Abscess is still large but does not seem overly fluctuant.   No new fevers, chills.     Discussed options of in office I&D vs sending to [facility] clinic.   Will send to Dr Rader.     Denies fevers, chills, weight loss, lightheadedness, dizziness, vision changes, sore throat, runny nose, CP, SOB, cough, palpitations, n/v/d, abd pain, black/bloody stools, arthralgias, mood disturbance, or new numbness/weakness/tingling in arms/legs/face.      Denies any major use of tobacco, alcohol or drugs     Visit Vitals  /74   Pulse 65   Ht 1.905 m (6' 3\")   Wt 84.7 kg (186 lb 12.8 oz)   SpO2 96%   BMI 23.35 kg/m²   Smoking Status Never   BSA 2.12 m²     PHYSICAL EXAM    Physical Exam     Visit Vitals  /74   Pulse 65   Ht 1.905 m (6' 3\")   Wt 84.7 kg (186 lb 12.8 oz)   SpO2 96%   BMI 23.35 kg/m²   Smoking Status Never   BSA 2.12 m²        General: NAD. NCAT. Aox3   HEENT: PERRLA. EOMI. MMM. Nares patent bl.  Cardiovascular: RRR. No MRG. S1/S2 wnl.   Respiratory: CTABL. No acute respiratory distress.   MSK: ROM x 4.   Extremities: No edema. Cap refill < 2 sec.   Skin: Quarter sized abscess near the R axilla. No LAD. No purulent drainage.  Neuro: Aox3. Cranial Nerves grossly intact. Motor/sensory wnl.   Psych: Mood wnl.           REVIEW OF SYSTEMS     ROS In HPI   No Known Allergies    Current Outpatient Medications   Medication Sig Dispense Refill    aspirin 81 mg EC tablet Take 1 tablet (81 mg) by mouth once daily.      doxycycline (Periostat) 20 mg tablet Take 1 tablet (20 mg) by mouth 2 times a day.      simvastatin (Zocor) 20 mg tablet Take 1 tablet (20 mg) by " mouth once daily at bedtime.      sulfamethoxazole-trimethoprim (Bactrim DS) 800-160 mg tablet Take 1 tablet by mouth 2 times a day for 7 days. 14 tablet 0     No current facility-administered medications for this visit.       Objective     No visits with results within 4 Month(s) from this visit.   Latest known visit with results is:   Lab on 02/20/2024   Component Date Value Ref Range Status    WBC 02/20/2024 4.3 (L)  4.4 - 11.3 x10*3/uL Final    nRBC 02/20/2024 0.0  0.0 - 0.0 /100 WBCs Final    RBC 02/20/2024 4.96  4.50 - 5.90 x10*6/uL Final    Hemoglobin 02/20/2024 15.6  13.5 - 17.5 g/dL Final    Hematocrit 02/20/2024 44.8  41.0 - 52.0 % Final    MCV 02/20/2024 90  80 - 100 fL Final    MCH 02/20/2024 31.5  26.0 - 34.0 pg Final    MCHC 02/20/2024 34.8  32.0 - 36.0 g/dL Final    RDW 02/20/2024 11.8  11.5 - 14.5 % Final    Platelets 02/20/2024 173  150 - 450 x10*3/uL Final    Glucose 02/20/2024 102 (H)  74 - 99 mg/dL Final    Sodium 02/20/2024 141  136 - 145 mmol/L Final    Potassium 02/20/2024 4.2  3.5 - 5.3 mmol/L Final    Chloride 02/20/2024 104  98 - 107 mmol/L Final    Bicarbonate 02/20/2024 29  21 - 32 mmol/L Final    Anion Gap 02/20/2024 12  10 - 20 mmol/L Final    Urea Nitrogen 02/20/2024 21  6 - 23 mg/dL Final    Creatinine 02/20/2024 0.98  0.50 - 1.30 mg/dL Final    eGFR 02/20/2024 88  >60 mL/min/1.73m*2 Final    Calcium 02/20/2024 9.3  8.6 - 10.6 mg/dL Final    Albumin 02/20/2024 4.2  3.4 - 5.0 g/dL Final    Alkaline Phosphatase 02/20/2024 65  33 - 136 U/L Final    Total Protein 02/20/2024 6.4  6.4 - 8.2 g/dL Final    AST 02/20/2024 37  9 - 39 U/L Final    Bilirubin, Total 02/20/2024 0.8  0.0 - 1.2 mg/dL Final    ALT 02/20/2024 49  10 - 52 U/L Final    Hemoglobin A1C 02/20/2024 5.1  see below % Final    Estimated Average Glucose 02/20/2024 100  Not Established mg/dL Final    Cholesterol 02/20/2024 145  0 - 199 mg/dL Final    HDL-Cholesterol 02/20/2024 53.2  mg/dL Final    Cholesterol/HDL Ratio 02/20/2024  2.7   Final    LDL Calculated 02/20/2024 79  <=99 mg/dL Final    VLDL 02/20/2024 12  0 - 40 mg/dL Final    Triglycerides 02/20/2024 62  0 - 149 mg/dL Final    Non HDL Cholesterol 02/20/2024 92  0 - 149 mg/dL Final    Thyroid Stimulating Hormone 02/20/2024 0.59  0.44 - 3.98 mIU/L Final    Vitamin D, 25-Hydroxy, Total 02/20/2024 34  30 - 100 ng/mL Final    Prostate Specific Antigen,Screen 02/20/2024 0.54  <=4.00 ng/mL Final       Radiology: Reviewed imaging in powerchart.  No results found.    No family history on file.  Social History     Socioeconomic History    Marital status: Single   Tobacco Use    Smoking status: Never    Smokeless tobacco: Never   Substance and Sexual Activity    Alcohol use: Not Currently    Drug use: Never     Past Medical History:   Diagnosis Date    Presence of coronary angioplasty implant and graft     S/P coronary artery stent placement     Past Surgical History:   Procedure Laterality Date    OTHER SURGICAL HISTORY  11/30/2020    Colonoscopy       Charting was completed using voice recognition technology and may include unintended errors.

## 2024-08-13 NOTE — PROGRESS NOTES
Subjective   Patient ID: Terrance Sosa is a 64 y.o. male who presents for No chief complaint on file..    HPI  64-year-old male was in Dr. Terry's office this morning with this complaint of a red erythematous area under his right armpit.  Patient was sent here because the primary care physician believes he has a abscess that needs to be drained.      Review of Systems  Review of systems is negative other than what is mentioned above        Physical Exam  Eyes: Conjunctiva non -icteric and eye lids are without obvious rash or drooping. Pupils are symmetric.   Ears, Nose, Mouth, and Throat: External ears and nose appear to be without deformity or rash. No lesions or masses noted. Hearing is grossly intact.   Neck:. No JVD noted, tracheal position is midline. No thyromegaly, no thyroid nodules  Head and Face: Examination of the head and face revealed no abnormalities.   Respiratory: No gasping or shortness of breath noted, no use of accessory muscles noted.  Lungs are clear to auscultate  Cardiovascular: Examination for edema is normal, regular rate and rhythm S1-S2  GI: Abdomen non tender to palpation, bowel sounds are present  Skin: Under the right armpit there is a 3 to 4 cm erythematous indurated area that appears to be an abscess  Musk: Digits/nails show no clubbing or cyanosis. No asymmetry or masses noted of the musculature. Examination of the muscles/joints/bones show normal range of motion. Gait is grossly normally.   Neurologic: Cranial nerves II- XII intact, motor strength 5/5 muscle strength of the lower extremities bilaterally and equal.      Objective     No diagnosis found.   Patient Active Problem List   Diagnosis    Coronary artery disease involving native coronary artery of native heart without angina pectoris    Annual physical exam    Elevated liver function tests    Vitamin D deficiency      No Known Allergies   Medication Documentation Review Audit       Reviewed by Dariana Terry MD (Physician)  on 08/13/24 at 1025      Medication Order Taking? Sig Documenting Provider Last Dose Status   aspirin 81 mg EC tablet [id number] Yes Take 1 tablet (81 mg) by mouth once daily. Historical Provider, MD Taking Active   doxycycline (Periostat) 20 mg tablet [id number] Yes Take 1 tablet (20 mg) by mouth 2 times a day. Historical Provider, MD Taking Active   simvastatin (Zocor) 20 mg tablet [id number] Yes Take 1 tablet (20 mg) by mouth once daily at bedtime. Historical Provider, MD Taking Active   sulfamethoxazole-trimethoprim (Bactrim DS) 800-160 mg tablet [id number] Yes Take 1 tablet by mouth 2 times a day for 7 days. Dariana Terry MD Taking Active                    Past Medical History:   Diagnosis Date    Presence of coronary angioplasty implant and graft     S/P coronary artery stent placement     Social History     Tobacco Use   Smoking Status Never   Smokeless Tobacco Never     No family history on file.   Past Surgical History:   Procedure Laterality Date    OTHER SURGICAL HISTORY  11/30/2020    Colonoscopy       Assessment/Plan   Today we had a discussion about patient's infected abscess. Patient was instructed this need to have incision and drainage.  This is an outpatient surgery that takes about 1 hour.  The would be an incision made in the area and infectious material will be removed,  possibility open wound that would have to be managed with the wound center versus a closed wound. The procedure would be done under general anesthesia, they need a ride to and from the hospital risk and benefits such as bleeding and infection were discussed.  Alternative measures were discussed as well.  All questions were answered patient would like to proceed.      Encounter Diagnosis   Name Primary?    Cutaneous abscess of right upper extremity Yes       I have reviewed all data including labs,radiologic and previous reports.       **Portions of this medical record have been created using voice recognition software and may have  minor errors which we are inherent in voice recognition systems it has not been fully edited for typographical or grammatical errors**        April Hinojosa PA-C

## 2024-08-19 ENCOUNTER — APPOINTMENT (OUTPATIENT)
Dept: SURGERY | Facility: CLINIC | Age: 64
End: 2024-08-19
Payer: COMMERCIAL

## 2024-08-19 DIAGNOSIS — L02.419 AXILLARY ABSCESS: Primary | ICD-10-CM

## 2024-08-19 PROCEDURE — 99024 POSTOP FOLLOW-UP VISIT: CPT | Performed by: PHYSICIAN ASSISTANT

## 2024-08-19 NOTE — PROGRESS NOTES
Subjective   Patient ID: Terrance Sosa is a 64 y.o. male who presents for [axillary incision and drainage right side      HPI  64-year-old gentleman with a right axillary abscess status post I&D 1 week ago.  Patient is doing well he removed the packing after 24 hours like he was told since then he has been keeping it covered with a Band-Aid.  Minimal drainage.    Review of Systems  Review of systems is negative other than what is mentioned above        Physical Exam  Eyes: Conjunctiva non -icteric and eye lids are without obvious rash or drooping. Pupils are symmetric.   Ears, Nose, Mouth, and Throat: External ears and nose appear to be without deformity or rash. No lesions or masses noted. Hearing is grossly intact.   Neck:. No JVD noted, tracheal position is midline. No thyromegaly, no thyroid nodules  Head and Face: Examination of the head and face revealed no abnormalities.   Respiratory: No gasping or shortness of breath noted, no use of accessory muscles noted.  Lungs are clear to auscultate  Cardiovascular: Examination for edema is normal, regular rate and rhythm S1-S2  GI: Abdomen non tender to palpation, bowel sounds are present  Skin: Right axillary area bandage removed.  Wound is healing.  There are some maceration due to moisture.  No purulent drainage no surrounding erythema no induration  Musk: Digits/nails show no clubbing or cyanosis. No asymmetry or masses noted of the musculature. Examination of the muscles/joints/bones show normal range of motion. Gait is grossly normally.   Neurologic: Cranial nerves II- XII intact, motor strength 5/5 muscle strength of the lower extremities bilaterally and equal.      Objective     No diagnosis found.   Patient Active Problem List   Diagnosis    Coronary artery disease involving native coronary artery of native heart without angina pectoris    Annual physical exam    Elevated liver function tests    Vitamin D deficiency      No Known Allergies   Medication  Documentation Review Audit       Reviewed by Dariana Terry MD (Physician) on 08/13/24 at 1025      Medication Order Taking? Sig Documenting Provider Last Dose Status   aspirin 81 mg EC tablet [id number] Yes Take 1 tablet (81 mg) by mouth once daily. Historical Provider, MD Taking Active   doxycycline (Periostat) 20 mg tablet [id number] Yes Take 1 tablet (20 mg) by mouth 2 times a day. Historical Provider, MD Taking Active   simvastatin (Zocor) 20 mg tablet [id number] Yes Take 1 tablet (20 mg) by mouth once daily at bedtime. Historical Provider, MD Taking Active   sulfamethoxazole-trimethoprim (Bactrim DS) 800-160 mg tablet [id number] Yes Take 1 tablet by mouth 2 times a day for 7 days. Dariana Trery MD Taking Active                    Past Medical History:   Diagnosis Date    Presence of coronary angioplasty implant and graft     S/P coronary artery stent placement     Social History     Tobacco Use   Smoking Status Never   Smokeless Tobacco Never     No family history on file.   Past Surgical History:   Procedure Laterality Date    OTHER SURGICAL HISTORY  11/30/2020    Colonoscopy       Assessment/Plan   Clean wound twice daily soap and water leave open to air when home or in bed.  Follow-up as needed.    Encounter Diagnosis   Name Primary?    Axillary abscess Yes       I have reviewed all data including labs,radiologic and previous reports.   **Portions of this medical record have been created using voice recognition software and may have minor errors which we are inherent in voice recognition systems it has not been fully edited for typographical or grammatical errors**    April Hinojosa PA-C

## 2024-09-23 ENCOUNTER — OFFICE VISIT (OUTPATIENT)
Dept: PRIMARY CARE | Facility: CLINIC | Age: 64
End: 2024-09-23
Payer: COMMERCIAL

## 2024-09-23 VITALS
HEIGHT: 75 IN | OXYGEN SATURATION: 96 % | DIASTOLIC BLOOD PRESSURE: 82 MMHG | SYSTOLIC BLOOD PRESSURE: 124 MMHG | BODY MASS INDEX: 23 KG/M2 | WEIGHT: 185 LBS | HEART RATE: 61 BPM

## 2024-09-23 DIAGNOSIS — T14.8XXA MUSCLE STRAIN: ICD-10-CM

## 2024-09-23 DIAGNOSIS — M54.10 NERVE ROOT INFLAMMATION: ICD-10-CM

## 2024-09-23 DIAGNOSIS — M54.9 BACK PAIN, UNSPECIFIED BACK LOCATION, UNSPECIFIED BACK PAIN LATERALITY, UNSPECIFIED CHRONICITY: Primary | ICD-10-CM

## 2024-09-23 PROCEDURE — 99213 OFFICE O/P EST LOW 20 MIN: CPT | Performed by: EMERGENCY MEDICINE

## 2024-09-23 PROCEDURE — 3008F BODY MASS INDEX DOCD: CPT | Performed by: EMERGENCY MEDICINE

## 2024-09-23 PROCEDURE — 1036F TOBACCO NON-USER: CPT | Performed by: EMERGENCY MEDICINE

## 2024-09-23 RX ORDER — CYCLOBENZAPRINE HCL 10 MG
10 TABLET ORAL NIGHTLY PRN
Qty: 10 TABLET | Refills: 0 | Status: SHIPPED | OUTPATIENT
Start: 2024-09-23 | End: 2024-10-03

## 2024-09-23 RX ORDER — PREDNISONE 20 MG/1
40 TABLET ORAL DAILY
Qty: 14 TABLET | Refills: 0 | Status: SHIPPED | OUTPATIENT
Start: 2024-09-23 | End: 2024-09-30

## 2024-09-23 ASSESSMENT — ENCOUNTER SYMPTOMS: BACK PAIN: 1

## 2024-09-23 NOTE — PROGRESS NOTES
Subjective   Patient ID: Terrance Sosa is a 64 y.o. male who presents for the following    Office visit    Assessment/Plan     Back pain: Patient has consistent ibuprofen persistent through NSAIDs. He has a history of herniated disk many years prior. We will prescribe prednisone and flexeril and continue to monitor.    RUE Abscess  -Continue Bactrim  -Pt to have I&D with Dr Rader today.   -Follow up with PCP       Back Pain        Denies fevers, chills, weight loss, lightheadedness, dizziness, vision changes, sore throat, runny nose, CP, SOB, cough, palpitations, n/v/d, abd pain, black/bloody stools, arthralgias, mood disturbance, or new numbness/weakness/tingling in arms/legs/face.      Denies any major use of tobacco, alcohol or drugs         PHYSICAL EXAM     General: NAD. NCAT. Aox3   HEENT: PERRLA. EOMI. MMM. Nares patent bl.  Cardiovascular: RRR. No MRG. S1/S2 wnl.   Respiratory: CTABL. No acute respiratory distress.   MSK: ROM x 4.   Extremities: No edema. Cap refill < 2 sec.   Skin: Quarter sized abscess near the R axilla. No LAD. No purulent drainage.  Neuro: Aox3. Cranial Nerves grossly intact. Motor/sensory wnl.   Psych: Mood wnl.       REVIEW OF SYSTEMS   Comprehensive review of symptoms was not positive for any symptoms other than HPI.       Current Outpatient Medications   Medication Sig Dispense Refill    aspirin 81 mg EC tablet Take 1 tablet (81 mg) by mouth once daily.      doxycycline (Periostat) 20 mg tablet Take 1 tablet (20 mg) by mouth 2 times a day.      simvastatin (Zocor) 20 mg tablet Take 1 tablet (20 mg) by mouth once daily at bedtime.       No current facility-administered medications for this visit.       Objective     No visits with results within 4 Month(s) from this visit.   Latest known visit with results is:   Lab on 02/20/2024   Component Date Value Ref Range Status    WBC 02/20/2024 4.3 (L)  4.4 - 11.3 x10*3/uL Final    nRBC 02/20/2024 0.0  0.0 - 0.0 /100 WBCs Final    RBC  02/20/2024 4.96  4.50 - 5.90 x10*6/uL Final    Hemoglobin 02/20/2024 15.6  13.5 - 17.5 g/dL Final    Hematocrit 02/20/2024 44.8  41.0 - 52.0 % Final    MCV 02/20/2024 90  80 - 100 fL Final    MCH 02/20/2024 31.5  26.0 - 34.0 pg Final    MCHC 02/20/2024 34.8  32.0 - 36.0 g/dL Final    RDW 02/20/2024 11.8  11.5 - 14.5 % Final    Platelets 02/20/2024 173  150 - 450 x10*3/uL Final    Glucose 02/20/2024 102 (H)  74 - 99 mg/dL Final    Sodium 02/20/2024 141  136 - 145 mmol/L Final    Potassium 02/20/2024 4.2  3.5 - 5.3 mmol/L Final    Chloride 02/20/2024 104  98 - 107 mmol/L Final    Bicarbonate 02/20/2024 29  21 - 32 mmol/L Final    Anion Gap 02/20/2024 12  10 - 20 mmol/L Final    Urea Nitrogen 02/20/2024 21  6 - 23 mg/dL Final    Creatinine 02/20/2024 0.98  0.50 - 1.30 mg/dL Final    eGFR 02/20/2024 88  >60 mL/min/1.73m*2 Final    Calcium 02/20/2024 9.3  8.6 - 10.6 mg/dL Final    Albumin 02/20/2024 4.2  3.4 - 5.0 g/dL Final    Alkaline Phosphatase 02/20/2024 65  33 - 136 U/L Final    Total Protein 02/20/2024 6.4  6.4 - 8.2 g/dL Final    AST 02/20/2024 37  9 - 39 U/L Final    Bilirubin, Total 02/20/2024 0.8  0.0 - 1.2 mg/dL Final    ALT 02/20/2024 49  10 - 52 U/L Final    Hemoglobin A1C 02/20/2024 5.1  see below % Final    Estimated Average Glucose 02/20/2024 100  Not Established mg/dL Final    Cholesterol 02/20/2024 145  0 - 199 mg/dL Final    HDL-Cholesterol 02/20/2024 53.2  mg/dL Final    Cholesterol/HDL Ratio 02/20/2024 2.7   Final    LDL Calculated 02/20/2024 79  <=99 mg/dL Final    VLDL 02/20/2024 12  0 - 40 mg/dL Final    Triglycerides 02/20/2024 62  0 - 149 mg/dL Final    Non HDL Cholesterol 02/20/2024 92  0 - 149 mg/dL Final    Thyroid Stimulating Hormone 02/20/2024 0.59  0.44 - 3.98 mIU/L Final    Vitamin D, 25-Hydroxy, Total 02/20/2024 34  30 - 100 ng/mL Final    Prostate Specific Antigen,Screen 02/20/2024 0.54  <=4.00 ng/mL Final       Radiology: Reviewed imaging in powerchart.  No results found.    No family  history on file.  Social History     Socioeconomic History    Marital status: Single   Tobacco Use    Smoking status: Never    Smokeless tobacco: Never   Substance and Sexual Activity    Alcohol use: Not Currently    Drug use: Never     Past Medical History:   Diagnosis Date    Presence of coronary angioplasty implant and graft     S/P coronary artery stent placement     Past Surgical History:   Procedure Laterality Date    OTHER SURGICAL HISTORY  11/30/2020    Colonoscopy       Charting was completed using voice recognition technology and may include unintended errors.

## 2025-03-04 ENCOUNTER — TELEPHONE (OUTPATIENT)
Dept: PRIMARY CARE | Facility: CLINIC | Age: 65
End: 2025-03-04
Payer: COMMERCIAL

## 2025-03-04 NOTE — TELEPHONE ENCOUNTER
PT would like a sooner appointment then what I show for 3/21/25      Cyst under right arm which was removed last year, but is back and hurting.

## 2025-03-06 ENCOUNTER — TELEPHONE (OUTPATIENT)
Dept: PRIMARY CARE | Facility: CLINIC | Age: 65
End: 2025-03-06
Payer: COMMERCIAL

## 2025-03-12 ENCOUNTER — APPOINTMENT (OUTPATIENT)
Dept: PRIMARY CARE | Facility: CLINIC | Age: 65
End: 2025-03-12
Payer: COMMERCIAL

## 2025-03-12 VITALS
OXYGEN SATURATION: 96 % | HEIGHT: 75 IN | DIASTOLIC BLOOD PRESSURE: 78 MMHG | SYSTOLIC BLOOD PRESSURE: 128 MMHG | BODY MASS INDEX: 23.75 KG/M2 | HEART RATE: 63 BPM | WEIGHT: 191 LBS

## 2025-03-12 DIAGNOSIS — E55.9 VITAMIN D DEFICIENCY: ICD-10-CM

## 2025-03-12 DIAGNOSIS — Z00.00 ANNUAL PHYSICAL EXAM: Primary | ICD-10-CM

## 2025-03-12 DIAGNOSIS — L02.92 BOIL: ICD-10-CM

## 2025-03-12 DIAGNOSIS — I25.10 CORONARY ARTERY DISEASE INVOLVING NATIVE CORONARY ARTERY OF NATIVE HEART WITHOUT ANGINA PECTORIS: ICD-10-CM

## 2025-03-12 DIAGNOSIS — R79.89 ELEVATED LIVER FUNCTION TESTS: ICD-10-CM

## 2025-03-12 PROCEDURE — 3008F BODY MASS INDEX DOCD: CPT | Performed by: INTERNAL MEDICINE

## 2025-03-12 PROCEDURE — 99396 PREV VISIT EST AGE 40-64: CPT | Performed by: INTERNAL MEDICINE

## 2025-03-12 PROCEDURE — 93000 ELECTROCARDIOGRAM COMPLETE: CPT | Performed by: INTERNAL MEDICINE

## 2025-03-12 RX ORDER — DOXYCYCLINE 100 MG/1
100 CAPSULE ORAL 2 TIMES DAILY
Qty: 20 CAPSULE | Refills: 0 | Status: SHIPPED | OUTPATIENT
Start: 2025-03-12 | End: 2025-03-22

## 2025-03-12 NOTE — PROGRESS NOTES
"Subjective   Patient ID: Terrance Sosa is a 64 y.o. male who presents for the following     PHYSICAL 2023 --> 2024 --> 3/12/25    Assessment/Plan   Boil under right arm: doxy     ELEVATE LIVER FUNCTION:  acute hepatitis panel and HIV niegative. He had a liver ultrasound in  which was wnl. Patient denies drinking alcohol. Recommend referral to GI     Vit D low, recommend 1000 units daily      colonoscopy with dr solares 2023 with 3-5 year follow up     CAD: last stress test 2021 is wnl. Ekg done with cardiology          HPI  male with htn, hld, cad comes for the following    Boil: under right arm reoccurrence s/p I &D. Fluctuant non painful no erythema.      INTERMITTENT elevated LFT's: not clinically signifcant and HIV, hepatitis panel, shilpa wnl . will continue to monitor      CAD in the past needing randolph stent to lad. he had at that time he had sob with playing basketball. no chest pain with exertion. following with dr gonsalves      HTN: patient denies any headaches, blurred vision or dizziness. patient denies any stroke like symptoms     HLD: Patient denies any muscle aches and is tolerating statin therapy     no etoh, no illegal dugs. no smokes     mom  cancer large cell lymphoma b     dad  from cancer pancreatic cancer     patient is a  [employer]     Visit Vitals  /78 (BP Location: Left arm, Patient Position: Sitting, BP Cuff Size: Adult)   Pulse 63   Ht 1.905 m (6' 3\")   Wt 86.6 kg (191 lb)   SpO2 96%   BMI 23.87 kg/m²   Smoking Status Never   BSA 2.14 m²     PHYSICAL EXAM     General appearance: Alert and in no acute distress. speech is clear and coherent  HEENT: Sclera and conjunctiva white, EOMI, uvela midline, no mouth lesions. PERRLA,  nasal turbinates are not swollen without exudate. TM's Mancia with cone of light, external ear canal with scant cerumen. No head trauma  Neck: no carotid bruits or thyromegaly. no lymphadenopathy   Respiratory : No " respiratory distress, normal respiratory rhythm and effort. Clear bilateral breath sounds. No wheezing or rhonchi.   Cardiovascular: heart rate regular, S1, S2. no murmurs. no Lower extremity edema  Skin inspection: Normal skin color and pigmentation, normal skin turgor and no visible rash, induration, or cellulitis  MSK: 5/5 strength upper and lower extremities without gait abnormalities. no loss of muscle mass   Neuro: 2-12 CN grossly intact.  no slurred speech. no lateralizing deficit  Psychiatric Orientation: Oriented to person, place, and time. no depression, homicidal or suicidal thoughts, normal affect  Abdomen: soft, none tender, none distended. no organomegaly    REVIEW OF SYSTEMS   Constitutional: not feeling tired and no fever, chills or sweats. Denies weight loss    HEENT: no earache and no sore throat. no blurred vision and or double vision. no headache  Cardiovascular: no exertional chest pain, no palpitations, no lower extremity edema and no intermittent leg claudication.   Lungs: Denies shortness of breath, exertional dyspnea, wheezing  Gastrointestinal: no change in bowel habits, no diarrhea, no nausea, no vomiting and no abdominal pain. Denies Melena, brbpr or dark stool  Musculoskeletal: no myalgias, no muscle weakness and no limb swelling.   Skin: no rashes, no change in skin color and pigmentation and no skin lumps.   Neurological: no headaches, no seizures, no numbness, no lateralizing deficits and no fainting.   Psychiatric: no depression and no anxiety.   Urine: denies polyuria, hematuria, dysuria   Endocrine: no cold intolerance, no heat intolerance      No Known Allergies    Current Outpatient Medications   Medication Sig Dispense Refill    aspirin 81 mg EC tablet Take 1 tablet (81 mg) by mouth once daily.      simvastatin (Zocor) 20 mg tablet Take 1 tablet (20 mg) by mouth once daily at bedtime.      cyclobenzaprine (Flexeril) 10 mg tablet Take 1 tablet (10 mg) by mouth as needed at  bedtime for muscle spasms (for pain) for up to 10 days. 10 tablet 0    doxycycline (Periostat) 20 mg tablet Take 1 tablet (20 mg) by mouth 2 times a day. (Patient not taking: Reported on 3/12/2025)       No current facility-administered medications for this visit.       Objective     No visits with results within 4 Month(s) from this visit.   Latest known visit with results is:   Lab on 02/20/2024   Component Date Value Ref Range Status    WBC 02/20/2024 4.3 (L)  4.4 - 11.3 x10*3/uL Final    nRBC 02/20/2024 0.0  0.0 - 0.0 /100 WBCs Final    RBC 02/20/2024 4.96  4.50 - 5.90 x10*6/uL Final    Hemoglobin 02/20/2024 15.6  13.5 - 17.5 g/dL Final    Hematocrit 02/20/2024 44.8  41.0 - 52.0 % Final    MCV 02/20/2024 90  80 - 100 fL Final    MCH 02/20/2024 31.5  26.0 - 34.0 pg Final    MCHC 02/20/2024 34.8  32.0 - 36.0 g/dL Final    RDW 02/20/2024 11.8  11.5 - 14.5 % Final    Platelets 02/20/2024 173  150 - 450 x10*3/uL Final    Glucose 02/20/2024 102 (H)  74 - 99 mg/dL Final    Sodium 02/20/2024 141  136 - 145 mmol/L Final    Potassium 02/20/2024 4.2  3.5 - 5.3 mmol/L Final    Chloride 02/20/2024 104  98 - 107 mmol/L Final    Bicarbonate 02/20/2024 29  21 - 32 mmol/L Final    Anion Gap 02/20/2024 12  10 - 20 mmol/L Final    Urea Nitrogen 02/20/2024 21  6 - 23 mg/dL Final    Creatinine 02/20/2024 0.98  0.50 - 1.30 mg/dL Final    eGFR 02/20/2024 88  >60 mL/min/1.73m*2 Final    Calcium 02/20/2024 9.3  8.6 - 10.6 mg/dL Final    Albumin 02/20/2024 4.2  3.4 - 5.0 g/dL Final    Alkaline Phosphatase 02/20/2024 65  33 - 136 U/L Final    Total Protein 02/20/2024 6.4  6.4 - 8.2 g/dL Final    AST 02/20/2024 37  9 - 39 U/L Final    Bilirubin, Total 02/20/2024 0.8  0.0 - 1.2 mg/dL Final    ALT 02/20/2024 49  10 - 52 U/L Final    Hemoglobin A1C 02/20/2024 5.1  see below % Final    Estimated Average Glucose 02/20/2024 100  Not Established mg/dL Final    Cholesterol 02/20/2024 145  0 - 199 mg/dL Final    HDL-Cholesterol 02/20/2024 53.2   mg/dL Final    Cholesterol/HDL Ratio 02/20/2024 2.7   Final    LDL Calculated 02/20/2024 79  <=99 mg/dL Final    VLDL 02/20/2024 12  0 - 40 mg/dL Final    Triglycerides 02/20/2024 62  0 - 149 mg/dL Final    Non HDL Cholesterol 02/20/2024 92  0 - 149 mg/dL Final    Thyroid Stimulating Hormone 02/20/2024 0.59  0.44 - 3.98 mIU/L Final    Vitamin D, 25-Hydroxy, Total 02/20/2024 34  30 - 100 ng/mL Final    Prostate Specific Antigen,Screen 02/20/2024 0.54  <=4.00 ng/mL Final       Radiology: Reviewed imaging in powerchart.  No results found.    No family history on file.  Social History     Socioeconomic History    Marital status: Single   Tobacco Use    Smoking status: Never    Smokeless tobacco: Never   Substance and Sexual Activity    Alcohol use: Not Currently    Drug use: Never     Past Medical History:   Diagnosis Date    Presence of coronary angioplasty implant and graft     S/P coronary artery stent placement     Past Surgical History:   Procedure Laterality Date    OTHER SURGICAL HISTORY  11/30/2020    Colonoscopy       Charting was completed using voice recognition technology and may include unintended errors.

## 2025-03-17 LAB
NON-UH HIE A/G RATIO: 1.4
NON-UH HIE ALB: 3.8 G/DL (ref 3.4–5)
NON-UH HIE ALK PHOS: 84 UNIT/L (ref 45–117)
NON-UH HIE BILIRUBIN, TOTAL: 0.8 MG/DL (ref 0.3–1.2)
NON-UH HIE BUN/CREAT RATIO: 20.9
NON-UH HIE BUN: 23 MG/DL (ref 9–23)
NON-UH HIE CALCIUM: 9.7 MG/DL (ref 8.7–10.4)
NON-UH HIE CALCULATED LDL CHOLESTEROL: 84 MG/DL (ref 60–130)
NON-UH HIE CALCULATED OSMOLALITY: 288 MOSM/KG (ref 275–295)
NON-UH HIE CHLORIDE: 105 MMOL/L (ref 98–107)
NON-UH HIE CHOLESTEROL: 151 MG/DL (ref 100–200)
NON-UH HIE CO2, VENOUS: 28 MMOL/L (ref 20–31)
NON-UH HIE CREATININE: 1.1 MG/DL (ref 0.6–1.1)
NON-UH HIE FREE T4: 1.06 NG/DL (ref 0.89–1.76)
NON-UH HIE GFR AA: >60
NON-UH HIE GLOBULIN: 2.8 G/DL
NON-UH HIE GLOMERULAR FILTRATION RATE: >60 ML/MIN/1.73M?
NON-UH HIE GLUCOSE: 89 MG/DL (ref 74–106)
NON-UH HIE GOT: 31 UNIT/L (ref 15–37)
NON-UH HIE GPT: 35 UNIT/L (ref 10–49)
NON-UH HIE HCT: 46 % (ref 41–52)
NON-UH HIE HDL CHOLESTEROL: 56 MG/DL (ref 40–60)
NON-UH HIE HGB A1C: 5 %
NON-UH HIE HGB: 15.8 G/DL (ref 13.5–17.5)
NON-UH HIE INSTR WBC ND: 5.1
NON-UH HIE K: 4.1 MMOL/L (ref 3.5–5.1)
NON-UH HIE MCH: 31 PG (ref 27–34)
NON-UH HIE MCHC: 34.4 G/DL (ref 32–37)
NON-UH HIE MCV: 90.3 FL (ref 80–100)
NON-UH HIE MPV: 7.7 FL (ref 7.4–10.4)
NON-UH HIE NA: 143 MMOL/L (ref 135–145)
NON-UH HIE PLATELET: 187 X10 (ref 150–450)
NON-UH HIE PROSTATIC SPECIFIC AG SCREEN: 0.6 NG/ML (ref 0–4)
NON-UH HIE RBC: 5.09 X10 (ref 4.7–6.1)
NON-UH HIE RDW: 12.6 % (ref 11.5–14.5)
NON-UH HIE TOTAL CHOL/HDL CHOL RATIO: 2.7
NON-UH HIE TOTAL PROTEIN: 6.6 G/DL (ref 5.7–8.2)
NON-UH HIE TRIGLYCERIDES: 53 MG/DL (ref 30–150)
NON-UH HIE TSH: 0.48 UIU/ML (ref 0.55–4.78)
NON-UH HIE VIT D 25: 33 NG/ML
NON-UH HIE WBC: 5.1 X10 (ref 4.5–11)

## 2025-05-20 ENCOUNTER — APPOINTMENT (OUTPATIENT)
Dept: PRIMARY CARE | Facility: CLINIC | Age: 65
End: 2025-05-20
Payer: COMMERCIAL